# Patient Record
Sex: MALE | Race: WHITE | HISPANIC OR LATINO | Employment: STUDENT | URBAN - METROPOLITAN AREA
[De-identification: names, ages, dates, MRNs, and addresses within clinical notes are randomized per-mention and may not be internally consistent; named-entity substitution may affect disease eponyms.]

---
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
## 2024-02-17 ENCOUNTER — HOSPITAL ENCOUNTER (OUTPATIENT)
Facility: HOSPITAL | Age: 19
Discharge: HOME | End: 2024-02-17
Attending: EMERGENCY MEDICINE
Payer: MEDICAID

## 2024-02-17 VITALS
BODY MASS INDEX: 23.8 KG/M2 | HEIGHT: 71 IN | SYSTOLIC BLOOD PRESSURE: 139 MMHG | RESPIRATION RATE: 18 BRPM | WEIGHT: 170 LBS | TEMPERATURE: 98 F | OXYGEN SATURATION: 98 % | DIASTOLIC BLOOD PRESSURE: 84 MMHG | HEART RATE: 98 BPM

## 2024-02-17 DIAGNOSIS — J11.1 INFLUENZA: Primary | ICD-10-CM

## 2024-02-17 LAB
FLUAV SUBTYP SPEC NAA+PROBE: DETECTED
FLUBV RNA ISLT QL NAA+PROBE: NOT DETECTED
SARS-COV-2 RNA RESP QL NAA+PROBE: NOT DETECTED
STREP A PCR: NOT DETECTED

## 2024-02-17 PROCEDURE — G0463 HOSPITAL OUTPT CLINIC VISIT: HCPCS | Performed by: EMERGENCY MEDICINE

## 2024-02-17 PROCEDURE — 87636 SARSCOV2 & INF A&B AMP PRB: CPT | Performed by: EMERGENCY MEDICINE

## 2024-02-17 PROCEDURE — 87651 STREP A DNA AMP PROBE: CPT | Performed by: EMERGENCY MEDICINE

## 2024-02-17 PROCEDURE — 99213 OFFICE O/P EST LOW 20 MIN: CPT | Performed by: EMERGENCY MEDICINE

## 2024-02-17 NOTE — FSED PROVIDER NOTE
Subjective   History of Present Illness  Patient is a well-appearing 19-year-old male who presents to the emergency room complaining of cough, congestion, intermittent bloody noses for the past several days.   Review of Systems   All other systems reviewed and are negative.      History reviewed. No pertinent past medical history.    No Known Allergies    History reviewed. No pertinent surgical history.    Family History   Problem Relation Age of Onset    No Known Problems Mother     No Known Problems Father        Social History     Socioeconomic History    Marital status: Single   Tobacco Use    Smoking status: Never    Smokeless tobacco: Never    Tobacco comments:     no vaping   Vaping Use    Vaping Use: Never used   Substance and Sexual Activity    Alcohol use: Yes     Comment: socially    Drug use: Never    Sexual activity: Defer           Objective   Physical Exam  Vitals and nursing note reviewed.   Constitutional:       Appearance: Normal appearance.   HENT:      Head: Normocephalic and atraumatic.      Right Ear: Tympanic membrane normal.      Left Ear: Tympanic membrane normal.      Nose: Nose normal.   Eyes:      Extraocular Movements: Extraocular movements intact.      Pupils: Pupils are equal, round, and reactive to light.   Cardiovascular:      Rate and Rhythm: Normal rate and regular rhythm.   Pulmonary:      Effort: Pulmonary effort is normal.      Breath sounds: Normal breath sounds.   Musculoskeletal:         General: Normal range of motion.      Cervical back: Normal range of motion and neck supple.   Skin:     General: Skin is warm and dry.   Neurological:      General: No focal deficit present.      Mental Status: He is alert and oriented to person, place, and time.         Procedures           ED Course                                           Medical Decision Making  Patient is a well-appearing 19-year-old male who presents to the emergency room with flulike symptoms.  He has been having  intermittent nosebleeds.  No active bleeding at this time.  He has an unremarkable physical exam.  His influenza swab is positive.  Patient will be discharged home with supportive care and close outpatient follow-up.  He is encouraged to return to the ED for any worsening in symptoms    Problems Addressed:  Influenza: acute illness or injury        Final diagnoses:   Influenza       ED Disposition  ED Disposition       ED Disposition   Discharge    Condition   Stable    Comment   --               Viviana Arambula MD  [address] [id number] [phone]               Medication List      No changes were made to your prescriptions during this visit.

## 2025-01-14 ENCOUNTER — HOSPITAL ENCOUNTER (EMERGENCY)
Facility: HOSPITAL | Age: 20
Discharge: HOME | End: 2025-01-14
Attending: EMERGENCY MEDICINE | Admitting: EMERGENCY MEDICINE
Payer: MEDICAID

## 2025-01-14 ENCOUNTER — APPOINTMENT (OUTPATIENT)
Dept: CT IMAGING | Facility: HOSPITAL | Age: 20
End: 2025-01-14
Payer: MEDICAID

## 2025-01-14 VITALS
RESPIRATION RATE: 18 BRPM | SYSTOLIC BLOOD PRESSURE: 130 MMHG | HEIGHT: 71 IN | DIASTOLIC BLOOD PRESSURE: 81 MMHG | WEIGHT: 215.5 LBS | OXYGEN SATURATION: 99 % | HEART RATE: 89 BPM | TEMPERATURE: 100.3 F | BODY MASS INDEX: 30.17 KG/M2

## 2025-01-14 DIAGNOSIS — K52.9 GASTROENTERITIS: ICD-10-CM

## 2025-01-14 DIAGNOSIS — B34.9 VIRAL ILLNESS: Primary | ICD-10-CM

## 2025-01-14 LAB
ALBUMIN SERPL-MCNC: 4.6 G/DL (ref 3.5–5.2)
ALBUMIN/GLOB SERPL: 1.6 G/DL
ALP SERPL-CCNC: 132 U/L (ref 39–117)
ALT SERPL W P-5'-P-CCNC: 27 U/L (ref 1–41)
ANION GAP SERPL CALCULATED.3IONS-SCNC: 10.4 MMOL/L (ref 5–15)
AST SERPL-CCNC: 18 U/L (ref 1–40)
BASOPHILS # BLD AUTO: 0.04 10*3/MM3 (ref 0–0.2)
BASOPHILS NFR BLD AUTO: 0.3 % (ref 0–1.5)
BILIRUB SERPL-MCNC: 0.9 MG/DL (ref 0–1.2)
BILIRUB UR QL STRIP: NEGATIVE
BUN SERPL-MCNC: 11 MG/DL (ref 6–20)
BUN/CREAT SERPL: 15.1 (ref 7–25)
CALCIUM SPEC-SCNC: 9.3 MG/DL (ref 8.6–10.5)
CHLORIDE SERPL-SCNC: 102 MMOL/L (ref 98–107)
CLARITY UR: CLEAR
CO2 SERPL-SCNC: 24.6 MMOL/L (ref 22–29)
COLOR UR: YELLOW
CREAT SERPL-MCNC: 0.73 MG/DL (ref 0.76–1.27)
DEPRECATED RDW RBC AUTO: 42.1 FL (ref 37–54)
EGFRCR SERPLBLD CKD-EPI 2021: 132.7 ML/MIN/1.73
EOSINOPHIL # BLD AUTO: 0 10*3/MM3 (ref 0–0.4)
EOSINOPHIL NFR BLD AUTO: 0 % (ref 0.3–6.2)
ERYTHROCYTE [DISTWIDTH] IN BLOOD BY AUTOMATED COUNT: 13 % (ref 12.3–15.4)
GLOBULIN UR ELPH-MCNC: 2.8 GM/DL
GLUCOSE SERPL-MCNC: 131 MG/DL (ref 65–99)
GLUCOSE UR STRIP-MCNC: NEGATIVE MG/DL
HCT VFR BLD AUTO: 46.6 % (ref 37.5–51)
HGB BLD-MCNC: 15.6 G/DL (ref 13–17.7)
HGB UR QL STRIP.AUTO: ABNORMAL
IMM GRANULOCYTES # BLD AUTO: 0.03 10*3/MM3 (ref 0–0.05)
IMM GRANULOCYTES NFR BLD AUTO: 0.2 % (ref 0–0.5)
KETONES UR QL STRIP: NEGATIVE
LEUKOCYTE ESTERASE UR QL STRIP.AUTO: NEGATIVE
LIPASE SERPL-CCNC: 12 U/L (ref 13–60)
LYMPHOCYTES # BLD AUTO: 0.35 10*3/MM3 (ref 0.7–3.1)
LYMPHOCYTES NFR BLD AUTO: 2.3 % (ref 19.6–45.3)
MCH RBC QN AUTO: 29.3 PG (ref 26.6–33)
MCHC RBC AUTO-ENTMCNC: 33.5 G/DL (ref 31.5–35.7)
MCV RBC AUTO: 87.4 FL (ref 79–97)
MONOCYTES # BLD AUTO: 0.66 10*3/MM3 (ref 0.1–0.9)
MONOCYTES NFR BLD AUTO: 4.3 % (ref 5–12)
NEUTROPHILS NFR BLD AUTO: 14.11 10*3/MM3 (ref 1.7–7)
NEUTROPHILS NFR BLD AUTO: 92.9 % (ref 42.7–76)
NITRITE UR QL STRIP: NEGATIVE
PH UR STRIP.AUTO: 7.5 [PH] (ref 5–8)
PLATELET # BLD AUTO: 293 10*3/MM3 (ref 140–450)
PMV BLD AUTO: 9.3 FL (ref 6–12)
POTASSIUM SERPL-SCNC: 3.3 MMOL/L (ref 3.5–5.2)
PROT SERPL-MCNC: 7.4 G/DL (ref 6–8.5)
PROT UR QL STRIP: NEGATIVE
RBC # BLD AUTO: 5.33 10*6/MM3 (ref 4.14–5.8)
SODIUM SERPL-SCNC: 137 MMOL/L (ref 136–145)
SP GR UR STRIP: 1.01 (ref 1–1.03)
UROBILINOGEN UR QL STRIP: ABNORMAL
WBC NRBC COR # BLD AUTO: 15.19 10*3/MM3 (ref 3.4–10.8)

## 2025-01-14 PROCEDURE — 96361 HYDRATE IV INFUSION ADD-ON: CPT

## 2025-01-14 PROCEDURE — 25010000002 ONDANSETRON PER 1 MG: Performed by: NURSE PRACTITIONER

## 2025-01-14 PROCEDURE — 25510000001 IOPAMIDOL PER 1 ML: Performed by: EMERGENCY MEDICINE

## 2025-01-14 PROCEDURE — 96374 THER/PROPH/DIAG INJ IV PUSH: CPT

## 2025-01-14 PROCEDURE — 25810000003 SODIUM CHLORIDE 0.9 % SOLUTION: Performed by: NURSE PRACTITIONER

## 2025-01-14 PROCEDURE — 99285 EMERGENCY DEPT VISIT HI MDM: CPT

## 2025-01-14 PROCEDURE — 83690 ASSAY OF LIPASE: CPT | Performed by: NURSE PRACTITIONER

## 2025-01-14 PROCEDURE — 85025 COMPLETE CBC W/AUTO DIFF WBC: CPT | Performed by: NURSE PRACTITIONER

## 2025-01-14 PROCEDURE — 80053 COMPREHEN METABOLIC PANEL: CPT | Performed by: NURSE PRACTITIONER

## 2025-01-14 PROCEDURE — 81003 URINALYSIS AUTO W/O SCOPE: CPT | Performed by: NURSE PRACTITIONER

## 2025-01-14 PROCEDURE — 74177 CT ABD & PELVIS W/CONTRAST: CPT

## 2025-01-14 RX ORDER — ONDANSETRON 4 MG/1
4 TABLET, ORALLY DISINTEGRATING ORAL EVERY 6 HOURS PRN
Qty: 28 TABLET | Refills: 0 | Status: SHIPPED | OUTPATIENT
Start: 2025-01-14 | End: 2025-01-21

## 2025-01-14 RX ORDER — PROCHLORPERAZINE EDISYLATE 5 MG/ML
10 INJECTION INTRAMUSCULAR; INTRAVENOUS ONCE
Status: DISCONTINUED | OUTPATIENT
Start: 2025-01-14 | End: 2025-01-14 | Stop reason: HOSPADM

## 2025-01-14 RX ORDER — IOPAMIDOL 755 MG/ML
100 INJECTION, SOLUTION INTRAVASCULAR
Status: COMPLETED | OUTPATIENT
Start: 2025-01-14 | End: 2025-01-14

## 2025-01-14 RX ORDER — ONDANSETRON 2 MG/ML
4 INJECTION INTRAMUSCULAR; INTRAVENOUS ONCE
Status: COMPLETED | OUTPATIENT
Start: 2025-01-14 | End: 2025-01-14

## 2025-01-14 RX ORDER — SODIUM CHLORIDE 0.9 % (FLUSH) 0.9 %
10 SYRINGE (ML) INJECTION AS NEEDED
Status: DISCONTINUED | OUTPATIENT
Start: 2025-01-14 | End: 2025-01-14 | Stop reason: HOSPADM

## 2025-01-14 RX ADMIN — SODIUM CHLORIDE 1000 ML: 9 INJECTION, SOLUTION INTRAVENOUS at 16:09

## 2025-01-14 RX ADMIN — ONDANSETRON 4 MG: 2 INJECTION INTRAMUSCULAR; INTRAVENOUS at 16:09

## 2025-01-14 RX ADMIN — IOPAMIDOL 100 ML: 755 INJECTION, SOLUTION INTRAVENOUS at 16:22

## 2025-01-14 NOTE — DISCHARGE INSTRUCTIONS
Call for a follow up appointment with your primary care for further evaluation and treatment.     Clear liquid diet for the next 24 hours, then bland diet, and advance to regular.     Tylenol/Motrin as needed for pain/fevers    Make sure patient is drinking plenty of fluids.    Return for any new or worsening symptoms.      Zofran as needed for nausea/vomiting     Voice recognition transcription technology was used for documentation on this chart.  Result there may be some typos and/or introduced into the chart that were overlooked during editing reviewing.

## 2025-01-14 NOTE — FSED PROVIDER NOTE
"Subjective   History of Present Illness  The patient is a 19-year-old male who presents to the ER with vomiting nonstop since 3 AM.  Patient denies any fevers.  Patient reports he does have some abdominal pain as well        History provided by:  Patient   used: No        Review of Systems    No past medical history on file.    No Known Allergies    No past surgical history on file.    Family History   Problem Relation Age of Onset    No Known Problems Mother     No Known Problems Father        Social History     Socioeconomic History    Marital status: Single   Tobacco Use    Smoking status: Never    Smokeless tobacco: Never    Tobacco comments:     no vaping   Vaping Use    Vaping status: Never Used   Substance and Sexual Activity    Alcohol use: Yes     Comment: socially    Drug use: Never    Sexual activity: Defer           Objective   Physical Exam    Procedures           ED Course  ED Course as of 01/14/25 1838   Tue Jan 14, 2025   1625 WBC(!): 15.19 [DS]   1625 Hemoglobin: 15.6 [DS]   1625 Hematocrit: 46.6 [DS]   1637 Glucose(!): 131 [DS]   1638 BUN: 11 [DS]   1638 Creatinine(!): 0.73 [DS]   1638 Sodium: 137 [DS]   1638 Potassium(!): 3.3 [DS]   1638 Lipase(!): 12 [DS]   1638 CT ABDOMEN PELVIS W CONTRAST     Date of Exam: 1/14/2025 4:12 PM EST     Indication: abdomoinal pain, \"nonstop vomiting since 3am\".     Comparison: None available.     Technique: Axial CT images were obtained of the abdomen and pelvis following the uneventful intravenous administration of iodinated contrast. Sagittal and coronal reconstructions were performed.  Automated exposure control and iterative reconstruction   methods were used.           Findings:     Lung Bases:  The visualized lung bases and lower mediastinal structures are unremarkable.     Peritoneum:  No free intraperitoneal air or fluid.      Abdominal wall:  Unremarkable.     Liver:  Liver is normal in size and contour. No focal lesions.   "   Biliary/Gallbladder:  The gallbladder is normal without evidence of radiopaque gallstones. The biliary tree is nondilated.     Pancreas:  Pancreas is within normal limits. There is no evidence of pancreatic mass or peripancreatic inflammatory changes.     Spleen:  Spleen is normal in size and contour.     Gastrointestinal/Mesentery:   No evidence of bowel obstruction or gross inflammatory changes. The appendix appears within normal limits.       Adrenals:  Adrenal glands are unremarkable.     Kidneys:  The kidneys are in anatomic position. No evidence of nephrolithiasis. No evidence of hydronephrosis or significant perinephric fat stranding.     Bladder:   The urinary bladder is unremarkable.     Reproductive organs:    The prostate and seminal vesicles are within normal limits.     Lymph Nodes:  No significant adenopathy is identified.      Vasculature:  Unremarkable.     Osseous Structures:    No acute fracture or aggressive lesions.        IMPRESSION:  Impression:  No acute intra-abdominal process evident.      [DS]   1705 Blood, UA(!): Trace [DS]   1734 Patient tolerating po fluids.  [DS]      ED Course User Index  [DS] Deb Millan, CARLA                                           Medical Decision Making  The patient is a 19-year-old male who presents to the ER with vomiting nonstop since 3 AM.  Patient denies any fevers.  Patient reports he does have some abdominal pain as well.     Abdominal exam without peritoneal signs. No evidence of acute abdomen at this time. Well appearing. Given work up, low suspicion for acute hepatobiliary disease (including acute cholecystitis or cholangitis), acute pancreatitis (neg lipase), PUD (including gastric perforation), acute infectious processes (pneumonia, hepatitis, pyelonephritis), acute appendicitis, vascular catastrophe, bowel obstruction, viscus perforation, or testicular torsion, diverticulitis. Presentation not consistent with other acute, emergent causes  of abdominal pain at this time.    Problems Addressed:  Gastroenteritis: complicated acute illness or injury  Viral illness: complicated acute illness or injury    Amount and/or Complexity of Data Reviewed  Labs: ordered. Decision-making details documented in ED Course.  Radiology: ordered.    Risk  Prescription drug management.        Final diagnoses:   Viral illness   Gastroenteritis       ED Disposition  ED Disposition       ED Disposition   Discharge    Condition   Stable    Comment   --               Viviana Arambula MD  [address] [facility] [place] [address] [phone]    Schedule an appointment as soon as possible for a visit in 1 week  As needed, If symptoms worsen         Medication List        New Prescriptions      ondansetron ODT 4 MG disintegrating tablet  Commonly known as: ZOFRAN-ODT  Place 1 tablet on the tongue Every 6 (Six) Hours As Needed for Nausea or Vomiting for up to 7 days.               Where to Get Your Medications        These medications were sent to [facility] [address] [place] [address] [place] - [address] [place] [address] - [phone]  - [phone] [address] [place] [address]      Phone: [phone]   ondansetron ODT 4 MG disintegrating tablet